# Patient Record
(demographics unavailable — no encounter records)

---

## 2024-11-11 NOTE — HISTORY OF PRESENT ILLNESS
[de-identified] : asthma exacerbation [FreeTextEntry6] : Mother reports that patient is significantly improved.  his last dose of albuterol was this morning before school. The last dose of orapred is tomorrow. His sleep has improved.

## 2024-11-11 NOTE — HISTORY OF PRESENT ILLNESS
[de-identified] : asthma exacerbation [FreeTextEntry6] : Mother reports that patient is significantly improved.  his last dose of albuterol was this morning before school. The last dose of orapred is tomorrow. His sleep has improved.

## 2024-11-11 NOTE — DISCUSSION/SUMMARY
[FreeTextEntry1] : 8 year old with recent exacerbation.  reviewed current medications. increase to 2 puffs twice a day on the Symbicort for season.  Discussed mother concerns regarding current asthma and allergy plans.  continue to wean off albuterol.  follow up in 2-3 days if symptoms persist, sooner if symptoms worsen

## 2024-11-15 NOTE — HISTORY OF PRESENT ILLNESS
[de-identified] : cough [FreeTextEntry6] : cough this am and slight congestion had cute exacerbation  a few weeks ago

## 2025-01-03 NOTE — DISCUSSION/SUMMARY
[FreeTextEntry1] :  8 year old with asthma exacerbation in setting of URI.   Advised albuterol  every 4 hours for 2 -3 days, then wean if possible to every 6 hours for another two days, then BID for 2 days. In addition, can use albuterol every 4 hours as needed. If it is required more frequently than every 4 hours he should go to the ER for further care.  Signs of respiratory distress reviewed and when to return to the office or seek further care in an emergency setting discussed.   continue Symbicort as prescribed.  Prednisone given to hold in case symptoms worsen due to previous exacerbation history. Can try Claritin or Zyrtec for nasal congestion, dosing reviewed.   Supportive care for URI  with Advil/Motrin or Tylenol advised. Dosing reviewed.   follow up in 4 days for respiratory check, sooner if symptoms worsen

## 2025-01-03 NOTE — REVIEW OF SYSTEMS
[Negative] : Genitourinary [Fever] : fever [Chills] : chills [Nasal Discharge] : nasal discharge [Nasal Congestion] : nasal congestion [Wheezing] : wheezing [Cough] : cough

## 2025-01-03 NOTE — PHYSICAL EXAM
[NL] : warm, clear [FreeTextEntry7] : diffuse wheezing scattered to bases but no increased work of breathing.

## 2025-01-03 NOTE — HISTORY OF PRESENT ILLNESS
[de-identified] : fever, cough [FreeTextEntry6] : Mother reports that patient had a fever on 12/26 which then self resolved after 24 hours.  He seemed ok after that and continued to various vacation events (holiday parties, ect).  Since last night he has a fever again.  He has more notable congestion and cough. He has been achy and had chills.  Last albuterol 6 hours prior to arrival, last dose of Motrin or Advil was 4 hours prior to arrival (10ml). mom gave zyrtec last night.  he takes Symbicort as usual for his asthma.

## 2025-01-03 NOTE — HISTORY OF PRESENT ILLNESS
[de-identified] : fever, cough [FreeTextEntry6] : Mother reports that patient had a fever on 12/26 which then self resolved after 24 hours.  He seemed ok after that and continued to various vacation events (holiday parties, ect).  Since last night he has a fever again.  He has more notable congestion and cough. He has been achy and had chills.  Last albuterol 6 hours prior to arrival, last dose of Motrin or Advil was 4 hours prior to arrival (10ml). mom gave zyrtec last night.  he takes Symbicort as usual for his asthma.

## 2025-01-08 NOTE — HISTORY OF PRESENT ILLNESS
[de-identified] : respiratory check [FreeTextEntry6] : Mother reports that patient's last fever 3 days ago.  He is still very congested and coughing.  He is using albuterol q6 hours for the last 2 days.  He is holding until the 6 hours dose.  the last dose of albuterol at 2pm, 4 hours prior to arrival. He did not need the prednisone.  last night mother noticed 1 hive on his back and a second one today on his face.

## 2025-01-08 NOTE — DISCUSSION/SUMMARY
[FreeTextEntry1] : 8 year old with asthma exacerbation with some focal findings on exam and new urticarial rash.  will treat for presumptive mycoplasma pneumonia with azithromycin x5 days, continue albuterol [id number]. plan to follow up in 3 days, sooner as needed.  can apply topical hydrocortisone for rash or try oral Zyrtec.  All parent's questions answered

## 2025-01-08 NOTE — DISCUSSION/SUMMARY
[FreeTextEntry1] : 8 year old with asthma exacerbation with some focal findings on exam and new urticarial rash.  will treat for presumptive mycoplasma pneumonia with azithromycin x5 days, continue albuterol [id number]. plan to follow up in 3 days, sooner as needed.  can apply topical hydrocortisone for rash or try oral Zyrtec.  All parent's questions answered

## 2025-01-08 NOTE — PHYSICAL EXAM
[Clear Rhinorrhea] : clear rhinorrhea [NL] : warm, clear [FreeTextEntry7] : pre-albuterol:   crackles on right, mild diffuse wheezing.  improvement after dose of albuterol with dose of albuterol [de-identified] : urticarial lesion under right eye and healing lesion on back

## 2025-01-08 NOTE — PHYSICAL EXAM
[Clear Rhinorrhea] : clear rhinorrhea [NL] : warm, clear [FreeTextEntry7] : pre-albuterol:   crackles on right, mild diffuse wheezing.  improvement after dose of albuterol with dose of albuterol [de-identified] : urticarial lesion under right eye and healing lesion on back

## 2025-01-08 NOTE — HISTORY OF PRESENT ILLNESS
[de-identified] : respiratory check [FreeTextEntry6] : Mother reports that patient's last fever 3 days ago.  He is still very congested and coughing.  He is using albuterol q6 hours for the last 2 days.  He is holding until the 6 hours dose.  the last dose of albuterol at 2pm, 4 hours prior to arrival. He did not need the prednisone.  last night mother noticed 1 hive on his back and a second one today on his face.

## 2025-01-13 NOTE — HISTORY OF PRESENT ILLNESS
[de-identified] : respiratory check [FreeTextEntry6] : Mother reports that patient is now on albuterol BID, last dose was last night.  He is still occasionally coughing, wet.  no fevers.   Mother recently met with another allergist for a second opinion regarding allergy desensitization therapy.

## 2025-01-13 NOTE — DISCUSSION/SUMMARY
[FreeTextEntry1] :  8 year old with recent asthma exacerbation and suspected mycoplasma pneumonia, now signfiicantly improved.  can discontinue albuterol and use only as needed.  continue Symbicort BID this week, can reduce back to typical dose of once daily next week.  Food allergies discussed.  Mother is considering SLIT therapy for desensitization for the future. She is unsure at this time if he could tolerate holding the dose sublingual. Discussed school allergy plan.  Mother would like to be present on school trips due to risk of cross contamination of foods and his inability to distinguish that at his age.   All parent's questions answered  follow up as needed

## 2025-03-10 NOTE — DISCUSSION/SUMMARY
[FreeTextEntry1] :  8 year old with asthma exacerbation in setting of URI.  take another 20mg of prednisone tonight for 40mg total today then take 20mg BID tomorrow. continue albuterol [id number]. Plan to follow up in office in 2 days.  Signs of respiratory distress reviewed and when to return to the office or seek further care in an emergency setting discussed.  discussed mothers concerns regarding h.pylori and round worm after kids saw the integrative medicine doctor.  will send stool studies for h pylori and ova and parasite study.

## 2025-03-10 NOTE — HISTORY OF PRESENT ILLNESS
[de-identified] : asthma [FreeTextEntry6] : Mother reports that patient began to have URI symtposm5 days ago.  4 days ago I spoke to mother on the phone and advised that she start 40mg prednisone due to worsening symptoms.  Mother gave 40mg against instruction Saturday, 20mg yesterday and 20mg today.   Today he is feeling muck better. no fevers since onset of symptoms.  last dose of albuterol was 3 hours prior to arrival .  He is on every 6 hours and overnight last night did not require the albuterol.  He is on Symbicort as well but had not been as consistent with use- he was using it about once a day.  Now he is using twice a day again since last week.

## 2025-03-10 NOTE — HISTORY OF PRESENT ILLNESS
[de-identified] : asthma [FreeTextEntry6] : Mother reports that patient began to have URI symtposm5 days ago.  4 days ago I spoke to mother on the phone and advised that she start 40mg prednisone due to worsening symptoms.  Mother gave 40mg against instruction Saturday, 20mg yesterday and 20mg today.   Today he is feeling muck better. no fevers since onset of symptoms.  last dose of albuterol was 3 hours prior to arrival .  He is on every 6 hours and overnight last night did not require the albuterol.  He is on Symbicort as well but had not been as consistent with use- he was using it about once a day.  Now he is using twice a day again since last week.

## 2025-03-10 NOTE — DISCUSSION/SUMMARY
[FreeTextEntry1] :  8 year old with asthma exacerbation in setting of URI.  take another 20mg of prednisone tonight for 40mg total today then take 20mg BID tomorrow. continue albuterol [id number]. Plan to follow up in office in 2 days.  Signs of respiratory distress reviewed and when to return to the office or seek further care in an emergency setting discussed.  discussed mothers concerns regarding h.pylori and round worm after kids saw the integrative medicine doctor.  will send stool studies for h pylori and ova and parasite study.

## 2025-03-10 NOTE — PHYSICAL EXAM
[Pale Nasal Mucosa] : pale nasal mucosa [Clear Rhinorrhea] : clear rhinorrhea [Hypertrophied Nasal Mucosa] : hypertrophied nasal mucosa [NL] : warm, clear [FreeTextEntry7] : pre-albuterol: no retractions, diffuse wheezing bilaterally. post-albuterol: resolution of wheezing bilaterally

## 2025-03-12 NOTE — HISTORY OF PRESENT ILLNESS
[de-identified] : asthma check [FreeTextEntry6] : Mother reports that patient did albuterol every 4 hours yesterday today he went back to school. Last dose of albuterol was 11;45 which was 4 hours prior to arrival .  no distress, feeling good, sporadic cough. no fevers

## 2025-03-12 NOTE — PHYSICAL EXAM
[NL] : warm, clear [FreeTextEntry7] : mild end expiratory wheezing, no retractions, good air entry to bases

## 2025-03-12 NOTE — DISCUSSION/SUMMARY
[FreeTextEntry1] :  8 year old with moderate persistent asthma with exacerbation, improvement since prior visit.  space albuterol to every 6 hours for today through Friday, space albuterol to every 12 hours for Saturday  can try to discontinue for Sunday (always can give albuterol every 4 hours as needed). if we cannot space the albuterol, next step would be to consider increase of Symbicort- advised to call here for recheck next week or with allergist if needed.  prior exacerbations this season reviewed (11/24/1/25). All parent's questions answered

## 2025-03-12 NOTE — HISTORY OF PRESENT ILLNESS
[de-identified] : asthma check [FreeTextEntry6] : Mother reports that patient did albuterol every 4 hours yesterday today he went back to school. Last dose of albuterol was 11;45 which was 4 hours prior to arrival .  no distress, feeling good, sporadic cough. no fevers

## 2025-03-12 NOTE — CARE PLAN
[Care Plan reviewed and provided to patient/caregiver] : Care plan reviewed and provided to patient/caregiver [Understands and communicates without difficulty] : Patient/Caregiver understands and communicates without difficulty [FreeTextEntry2] : asthma exacerbation management past exacerbations requiring prednisone November 2024, January 2025 [FreeTextEntry3] : space albuterol to every 6 hours for today through Friday, space albuterol to every 12 hours for Saturday  can try to discontinue for Sunday (always every 4 hours as needed). if we cannot space the albuterol, next step would be to consider increase of Symbicort.

## 2025-04-01 NOTE — DISCUSSION/SUMMARY
[FreeTextEntry1] : Justin is a 8 year old male with hx of persistent asthma on Symbicort. woke with a dry cough Went to school nurse Was given 2 puffs of albuterol Nurse did not hear wheezing thought she heard a prolonged crackling sound. Mother did increase his Symbicort to twice daily as of yesterday when she noted him to cough She also gave cetirizine on Sunday due to congestion, Family planning on Traveling mid April to [place] Mother states that others in home have sinus issues and congestion,  His exam is reassuring he does have intermittent dry cough at times Advised can uses albuterol as needed Start Cetirizine to see if helpful as he is starting allergy season. Ok to increase Symbicort during increase periods when coughing RTO if worseing.

## 2025-04-01 NOTE — HISTORY OF PRESENT ILLNESS
[de-identified] : cough [FreeTextEntry6] : hx of Persistent asthma Takes Symbicort 80-4.5 1 puff BID   nasal congestion on Sunday Zyrtec Today at school was coughing had 2 puff of albuterol  this afternoon 12:45 Nurse noted on right side possible crackling sibling sick and parents have sinus symptoms  no fevers Symbicort  2 puffs once per day  when sick wll increase to BID Started Twice per day yesterday

## 2025-06-18 NOTE — DISCUSSION/SUMMARY
[Full Activity without restrictions including Physical Education & Athletics] : Full Activity without restrictions including Physical Education & Athletics [FreeTextEntry1] :  8 year old for well care, growing well, receiving appropriate school based services.   Continue balanced diet with all food groups. Brush teeth twice a day with toothbrush. Recommend visit to dentist. Help child to maintain consistent daily routines and sleep schedule. School discussed. Ensure home is safe. Teach child about personal safety. Use consistent, positive discipline. Limit screen time to no more than 2 hours per day. Encourage physical activity. Child needs to ride in a belt-positioning booster seat until  4 feet 9 inches has been reached and are between 8 and 12 years of age.   Social Determinants of Health domains were screened and scored, no risks identified, no support requested.  vision screen results likely due to inattention- he recently had a complete eye evaluation and there were no concerns with his vision.   follow up in 1 year for well , sooner as needed

## 2025-06-18 NOTE — HISTORY OF PRESENT ILLNESS
[Supervised outdoor play] : supervised outdoor play [Parent knows child's friends] : parent knows child's friends [Parent discusses safety rules regarding adults] : parent discusses safety rules regarding adults [Monitored computer use] : monitored computer use [Exposure to electronic nicotine delivery system] : No exposure to electronic nicotine delivery system [FreeTextEntry3] : 1mg melatonin  at bedtime [FreeTextEntry9] : is doing boxing for activity.   [de-identified] : St, OT, counseling once a week, integrated class.   [NO] : No [FreeTextEntry1] : He is taking symbicort maintenance for the asthma and doing well.

## 2025-06-18 NOTE — HISTORY OF PRESENT ILLNESS
[Supervised outdoor play] : supervised outdoor play [Parent knows child's friends] : parent knows child's friends [Parent discusses safety rules regarding adults] : parent discusses safety rules regarding adults [Monitored computer use] : monitored computer use [Exposure to electronic nicotine delivery system] : No exposure to electronic nicotine delivery system [FreeTextEntry3] : 1mg melatonin  at bedtime [FreeTextEntry9] : is doing boxing for activity.   [de-identified] : St, OT, counseling once a week, integrated class.   [NO] : No [FreeTextEntry1] : He is taking symbicort maintenance for the asthma and doing well.

## 2025-07-06 NOTE — HISTORY OF PRESENT ILLNESS
[de-identified] : Matthieu is an 9yo male presenting with increased constipation and decreased appetite. He typically has a BM 1x a day [place] 1 but recently has been having more difficulty over the last 2 weeks. He takes a long time in the bathroom but sometimes will sit in the bathroom on his phone and nothing will come out. He does not have any pain or blood. He has had chronic constipation since he was a baby, and he had anal fissures and often had to use glycerin suppositories. He had a viral illness with some loose stool and fever 2 weeks ago, which self-resolved.  He started having rectal itchiness since February despite bathing daily, and went to an Eastern medicine doctor, who did electrical stimulation testing, and was told he had nematodes and helminthic infection, went to pediatrician, did not take any medication. Itchiness has improved.  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

## 2025-07-06 NOTE — ASSESSMENT
[FreeTextEntry1] : Matthieu is an 9yo male presenting with increased constipation for past 2 weeks characterized by small hard stools and spends a long time in the bathroom.  Symptoms may have worsened after recent virus and he had some testing done at a more holistic doctor starting possible parasite infections.  He also was complaining of rectal itchingRecommend: -  start Benefiber 1 tbsp daily to supplement fiber intake; can increase to 2 tbsp daily as needed.  - Encourage eating fruits and vegetables, which will also help his constipation. -  Given father's history of H. pylori, complete stool testing for H. pylori antigen, and also stool testing for ova and parasites - Family to call with any questions or concerns, Follow up with the office in September.

## 2025-07-06 NOTE — CONSULT LETTER
[Dear  ___] : Dear  [unfilled], [Consult Letter:] : I had the pleasure of evaluating your patient, [unfilled]. [Please see my note below.] : Please see my note below. [Consult Closing:] : Thank you very much for allowing me to participate in the care of this patient.  If you have any questions, please do not hesitate to contact me. [Sincerely,] : Sincerely, [FreeTextEntry3] : Ashley Anderson MD Attending Physician Pediatric Gastroenterology and Nutrition  No

## 2025-07-06 NOTE — HISTORY OF PRESENT ILLNESS
No [de-identified] : Matthieu is an 9yo male presenting with increased constipation and decreased appetite. He typically has a BM 1x a day [place] 1 but recently has been having more difficulty over the last 2 weeks. He takes a long time in the bathroom but sometimes will sit in the bathroom on his phone and nothing will come out. He does not have any pain or blood. He has had chronic constipation since he was a baby, and he had anal fissures and often had to use glycerin suppositories. He had a viral illness with some loose stool and fever 2 weeks ago, which self-resolved.  He started having rectal itchiness since February despite bathing daily, and went to an Eastern medicine doctor, who did electrical stimulation testing, and was told he had nematodes and helminthic infection, went to pediatrician, did not take any medication. Itchiness has improved.  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

## 2025-07-06 NOTE — CONSULT LETTER
[Dear  ___] : Dear  [unfilled], [Consult Letter:] : I had the pleasure of evaluating your patient, [unfilled]. [Please see my note below.] : Please see my note below. [Consult Closing:] : Thank you very much for allowing me to participate in the care of this patient.  If you have any questions, please do not hesitate to contact me. [Sincerely,] : Sincerely, [FreeTextEntry3] : Ashley Anderson MD Attending Physician Pediatric Gastroenterology and Nutrition

## 2025-07-06 NOTE — PHYSICAL EXAM
[Well Developed] : well developed [NAD] : in no acute distress [PERRL] : pupils were equal, round, reactive to light  [Moist & Pink Mucous Membranes] : moist and pink mucous membranes [CTAB] : lungs clear to auscultation bilaterally [Regular Rate and Rhythm] : regular rate and rhythm [Normal S1, S2] : normal S1 and S2 [Soft] : soft  [Normal Bowel Sounds] : normal bowel sounds [No HSM] : no hepatosplenomegaly appreciated [Normal Tone] : normal tone [Well-Perfused] : well-perfused [Interactive] : interactive [Well Nourished] : well nourished [Alert and Active] : alert and active [icteric] : anicteric [Normal Oropharynx] : the oropharynx was normal [Respiratory Distress] : no respiratory distress  [Wheeze] : no wheezing  [Murmur] : no murmur [Distended] : non distended [Tender] : non tender [Stool Palpable] : no stool palpable [Mass ___ cm] : no masses were palpated [Rectal Exam Deferred] : rectal exam was deferred [Edema] : no edema [Cyanosis] : no cyanosis [Rash] : no rash [Jaundice] : no jaundice